# Patient Record
(demographics unavailable — no encounter records)

---

## 2025-05-12 NOTE — HISTORY OF PRESENT ILLNESS
[TextBox_4] : 24yo  LMP 4/21/2025 presents for an annual gyn exam and OCP refills.  She is considering going off the OCPs and see how her.  On not being on it as she is not sexually active.  However she has not fully committed to that idea yet so she wants me to send in the pills in case she changes her mind.  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   [PapSmeardate] : 5/12/2025 [Previously active] : previously active [Vaginal] : vaginal

## 2025-05-12 NOTE — PHYSICAL EXAM
[MA] : MA [FreeTextEntry2] : Dena [Examination Of The Breasts] : a normal appearance [No Masses] : no breast masses were palpable [Labia Majora] : normal [Labia Minora] : normal [Normal] : normal [Uterine Adnexae] : normal